# Patient Record
Sex: FEMALE | Race: WHITE
[De-identification: names, ages, dates, MRNs, and addresses within clinical notes are randomized per-mention and may not be internally consistent; named-entity substitution may affect disease eponyms.]

---

## 2018-01-08 ENCOUNTER — HOSPITAL ENCOUNTER (EMERGENCY)
Dept: HOSPITAL 61 - ER | Age: 43
Discharge: HOME | End: 2018-01-08
Payer: COMMERCIAL

## 2018-01-08 DIAGNOSIS — S39.012A: Primary | ICD-10-CM
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
## 2018-01-08 LAB — URINE HCG POC: (no result)

## 2018-01-08 PROCEDURE — 81025 URINE PREGNANCY TEST: CPT

## 2018-01-08 PROCEDURE — 99284 EMERGENCY DEPT VISIT MOD MDM: CPT

## 2018-01-08 PROCEDURE — 72100 X-RAY EXAM L-S SPINE 2/3 VWS: CPT

## 2018-01-08 RX ADMIN — HYDROCODONE BITARTRATE AND ACETAMINOPHEN 1 TAB: 5; 325 TABLET ORAL at 13:06

## 2018-02-21 ENCOUNTER — HOSPITAL ENCOUNTER (OUTPATIENT)
Dept: HOSPITAL 61 - KCIC | Age: 43
Discharge: HOME | End: 2018-02-21
Attending: FAMILY MEDICINE
Payer: COMMERCIAL

## 2018-02-21 DIAGNOSIS — M25.761: ICD-10-CM

## 2018-02-21 DIAGNOSIS — M17.11: Primary | ICD-10-CM

## 2018-02-21 PROCEDURE — 73562 X-RAY EXAM OF KNEE 3: CPT

## 2018-02-23 ENCOUNTER — HOSPITAL ENCOUNTER (EMERGENCY)
Dept: HOSPITAL 61 - ER | Age: 43
Discharge: HOME | End: 2018-02-23
Payer: COMMERCIAL

## 2018-02-23 DIAGNOSIS — M25.561: Primary | ICD-10-CM

## 2018-02-23 PROCEDURE — 99284 EMERGENCY DEPT VISIT MOD MDM: CPT

## 2018-02-23 PROCEDURE — 96372 THER/PROPH/DIAG INJ SC/IM: CPT

## 2018-02-23 RX ADMIN — DEXAMETHASONE SODIUM PHOSPHATE 1 MG: 4 INJECTION, SOLUTION INTRAMUSCULAR; INTRAVENOUS at 15:32

## 2018-02-23 RX ADMIN — KETOROLAC TROMETHAMINE 1 MG: 30 INJECTION, SOLUTION INTRAMUSCULAR at 15:32

## 2018-02-23 RX ADMIN — MORPHINE SULFATE 1 MG: 10 INJECTION INTRAMUSCULAR; INTRAVENOUS; SUBCUTANEOUS at 15:33

## 2019-08-22 ENCOUNTER — HOSPITAL ENCOUNTER (OUTPATIENT)
Dept: HOSPITAL 61 - SURG | Age: 44
Discharge: HOME | End: 2019-08-22
Attending: SURGERY
Payer: COMMERCIAL

## 2019-08-22 VITALS — HEIGHT: 69 IN | WEIGHT: 275.58 LBS | BODY MASS INDEX: 40.82 KG/M2

## 2019-08-22 VITALS — SYSTOLIC BLOOD PRESSURE: 131 MMHG | DIASTOLIC BLOOD PRESSURE: 82 MMHG

## 2019-08-22 DIAGNOSIS — F17.210: ICD-10-CM

## 2019-08-22 DIAGNOSIS — K80.12: Primary | ICD-10-CM

## 2019-08-22 DIAGNOSIS — Z98.51: ICD-10-CM

## 2019-08-22 DIAGNOSIS — Z98.890: ICD-10-CM

## 2019-08-22 DIAGNOSIS — K21.9: ICD-10-CM

## 2019-08-22 PROCEDURE — 47563 LAPARO CHOLECYSTECTOMY/GRAPH: CPT

## 2019-08-22 PROCEDURE — 88304 TISSUE EXAM BY PATHOLOGIST: CPT

## 2019-08-22 PROCEDURE — 81025 URINE PREGNANCY TEST: CPT

## 2019-08-22 PROCEDURE — A7015 AEROSOL MASK USED W NEBULIZE: HCPCS

## 2019-08-22 PROCEDURE — 74300 X-RAY BILE DUCTS/PANCREAS: CPT

## 2019-08-22 RX ADMIN — FENTANYL CITRATE PRN MCG: 50 INJECTION INTRAMUSCULAR; INTRAVENOUS at 12:07

## 2019-08-22 RX ADMIN — FENTANYL CITRATE PRN MCG: 50 INJECTION INTRAMUSCULAR; INTRAVENOUS at 12:21

## 2019-08-22 NOTE — RAD
Indication: Intraoperative cholangiogram.

 

TECHNIQUE: Intraoperative cholangiogram with total fluoroscopy time of 

0.10 minutes and 3 images.

 

COMPARISON: None

 

FINDINGS:

No filling defects seen in the CBD. Cystic duct is cannulated. Contrast is

seen in the second portion of duodenum.

 

IMPRESSION:

No fluoroscopic evidence of choledocholithiasis.

 

Electronically signed by: Trav Melgar DO (8/22/2019 11:05 AM) [facility]

## 2019-08-22 NOTE — DISCH
DISCHARGE INSTRUCTIONS


Condition on Discharge


Condition on Discharge:  Stable





Activity After Discharge


Activity Instructions for Disc:  Other, see below (no lifting over 20 lbs X 2 

weeks)





Diet after Discharge


Diet after Discharge:  Regular





Wound Incision Care


Wound/Incision Care:  Other, see below (may remove bandaids tomorrow and shower)





Follow-Up


Follow up with:  Dr Mueller in 2 weeks in office, call for appt [phone]











JOSÉ LUIS MUELLER MD             Aug 22, 2019 12:04

## 2019-08-22 NOTE — PDOC4
Operative Note


Operative Note


Operative Note:





Preoperative Diagnosis: Symptomatic cholelithiasis





Postoperative Diagnosis: Acute and chronic cholecystitis





Procedure: Laparoscopic cholecystectomy with intraoperative cholangiogram





Surgeons: Bud





Assistant: Yudith SHAHID





Anesthesia: Gen.





Estimated Blood Loss: 50 mL





Specimen: Gallbladder to pathology





Drains: None





Complications: None





Indications: The patient is a 44-year-old female who is been experiencing 

recurrent upper abdominal pain consistent with biliary colic.   Surgical 

treatment was offered by means of a laparoscopic cholecystectomy.  The risks of 

surgery were discussed which include bleeding, infection, bile duct injury, bile

leak, pain, the potential for additional surgeries or procedures.  The patient 

understands and would like to proceed.





Description:  The patient was taken to the operating room and laid supine on the

operating table.  General anesthesia was performed.  The abdomen was prepped 

with ChloraPrep and draped in a standard surgical fashion.  A small 

infraumbilical incision was made with a scalpel.  The Veress needle was then 

inserted and a pneumoperitoneum was then created.  A  5 mm trocar was then 

inserted and the laparoscope was introduced.  In the upper midabdomen an 11 mm 

trocar was inserted and in the right upper quadrant two 5 mm trochars were 

inserted.  The gallbladder appeared acute and chronically inflamed was some 

surrounding adhesions. The adherent omentum was peeled away and the gallbladder 

was aspirated for decompression. The gallbladder was retracted cephalad.  The 

cystic duct was dissected free from surrounding tissues.  One clip was placed on

the duct near the gallbladder junction.  An opening was made in the duct and a 

cholangiocatheter placed within and secured with a clip.  Using contrast dye and

fluoroscopy an intraoperative cholangiogram was performed that appeared 

unremarkable.  The clip and catheter were then withdrawn.  Three clips were 

placed on the cystic duct and it was divided.  The cystic artery was then 

identified, dissected free, doubly clipped and divided as well.  The gallbladder

was then mobilized away from the liver with cautery.  The umbilical 5 millimeter

trocar was exchanged for an 11 millimeter trocar.  The gallbladder was then 

placed in an endoscopic bag and extracted at the superior trocar site.  The 

fascia there was closed with an 0 PDS sutures.  All blood and irrigation fluid 

was suctioned and hemostasis was good.  The remaining ports were removed and the

pneumoperitoneum was relieved.  The skin incisions were injected with half 

percent Marcaine, and all were closed using 4-0 Monocryl suture.  Steri-Strips 

and dressings were then applied.  The patient tolerated the procedure well and 

was sent to the recovery room in stable condition.  At the end of the case all 

counts were correct.











JOSÉ LUIS MUELLER MD             Aug 22, 2019 12:01

## 2019-08-26 NOTE — PATHOLOGY
[facility] Accession Number: [id number]

.                                                                01

Material submitted:                                        .

gallbladder - GALLBLADDER AND CONTENTS

.                                                                01

Clinical history:                                          .

Cholelithiasis.

.                                                                02

**********************************************************************

Diagnosis:

Gallbladder, cholecystectomy:

- Cholelithiasis.

- Acute hemorrhagic and chronic cholecystitis with increased eosinophils.

(JPM:[facility]; 8/26/2019)

QMS/08/26/2019

**********************************************************************

.                                                                02

Comment:

There is no evidence of malignancy.

.                                                                02

Electronically signed:                                     .

Jj Connors MD, Pathologist

NPI- [id number]

.                                                                01

Gross description:                                         .

Received in formalin labeled "Mauricio, Susannah, gallbladder and contents"

is an intact cholecystectomy specimen measuring 9.6 x 4.0 x 3.0 cm. The

serosa is pink-red and hemorrhagic and the specimen is opened to reveal

pink-red hemorrhagic mucosa without polyps or masses. The average wall

thickness is 0.3 cm. Calculi are present, which are yellow-tan and

bosselated, measuring 4.5 x 2.3 x 1.0 cm in aggregate and ranging from

0.1-1.6 cm in greatest dimension. Representative sections of the fundus

and body and the cystic duct margin are submitted in A1. ([facility]; 8/22/2019)

SYC/SYC

.                                                                02

Pathologist provided ICD-10:

K80.12

.                                                                02

CPT                                                        .

[id number]

Specimen Comment: A courtesy copy of this report has been sent to

Specimen Comment: [phone], [phone].

Specimen Comment: Report sent to  / DR HOLLIS

***Performed at:  01

   [address], [place], [place]  [id number]

   MD Efrain Rosen MD Phone:  [phone]

***Performed at:  02

   [facility]

   [address], [place]  [id number]

   MD Jj Connors MD Phone:  [phone]